# Patient Record
Sex: MALE | Race: WHITE | NOT HISPANIC OR LATINO | URBAN - METROPOLITAN AREA
[De-identification: names, ages, dates, MRNs, and addresses within clinical notes are randomized per-mention and may not be internally consistent; named-entity substitution may affect disease eponyms.]

---

## 2017-05-25 ENCOUNTER — EMERGENCY (EMERGENCY)
Facility: HOSPITAL | Age: 62
LOS: 1 days | Discharge: HOME | End: 2017-05-25
Attending: EMERGENCY MEDICINE | Admitting: EMERGENCY MEDICINE

## 2017-05-25 VITALS
RESPIRATION RATE: 16 BRPM | DIASTOLIC BLOOD PRESSURE: 76 MMHG | SYSTOLIC BLOOD PRESSURE: 116 MMHG | TEMPERATURE: 99 F | HEART RATE: 69 BPM | OXYGEN SATURATION: 97 %

## 2017-05-25 DIAGNOSIS — S61.411A LACERATION WITHOUT FOREIGN BODY OF RIGHT HAND, INITIAL ENCOUNTER: ICD-10-CM

## 2017-05-25 NOTE — ED ADULT NURSE NOTE - OBJECTIVE STATEMENT
Pt reports he was at work and sustained lacerations to right palm and left index finger from metal studs. Pt denies any pain or other injury, no bleeding noted